# Patient Record
Sex: FEMALE | Race: BLACK OR AFRICAN AMERICAN | NOT HISPANIC OR LATINO | Employment: FULL TIME | URBAN - METROPOLITAN AREA
[De-identification: names, ages, dates, MRNs, and addresses within clinical notes are randomized per-mention and may not be internally consistent; named-entity substitution may affect disease eponyms.]

---

## 2017-01-20 ENCOUNTER — OFFICE VISIT (OUTPATIENT)
Dept: OBSTETRICS AND GYNECOLOGY | Facility: CLINIC | Age: 27
End: 2017-01-20

## 2017-01-20 VITALS
SYSTOLIC BLOOD PRESSURE: 118 MMHG | HEIGHT: 62 IN | RESPIRATION RATE: 18 BRPM | WEIGHT: 193 LBS | BODY MASS INDEX: 35.51 KG/M2 | DIASTOLIC BLOOD PRESSURE: 80 MMHG

## 2017-01-20 DIAGNOSIS — Z30.431 IUD CHECK UP: ICD-10-CM

## 2017-01-20 DIAGNOSIS — N89.8 VAGINAL ODOR: Primary | ICD-10-CM

## 2017-01-20 DIAGNOSIS — IMO0001 WOMAN OF CHILDBEARING POTENTIAL: ICD-10-CM

## 2017-01-20 LAB
B-HCG UR QL: NEGATIVE
INTERNAL NEGATIVE CONTROL: NORMAL
INTERNAL POSITIVE CONTROL: NORMAL
Lab: NORMAL

## 2017-01-20 PROCEDURE — 81025 URINE PREGNANCY TEST: CPT | Performed by: OBSTETRICS & GYNECOLOGY

## 2017-01-20 PROCEDURE — 99213 OFFICE O/P EST LOW 20 MIN: CPT | Performed by: OBSTETRICS & GYNECOLOGY

## 2017-01-20 NOTE — PROGRESS NOTES
"Subjective   Chief Complaint   Patient presents with   • Follow-up     Mirena String Check.    • Vaginitis     C/o vaginal odor x 2 weeks      Jaida Jurado is a 26 y.o. year old  presenting to be seen for evaluation of an abnormal vaginal discharge. The discharge is white.  Her symptoms have been present for 2 week(s).  Additional she has noticed odor.    Prior to the onset of symptoms she was not on systemic antibiotics.  She has not recently changed soaps/detergents/toilet tissue.  Prior to this visit, she has used nothing in an attempt to improve her symptoms.    SEXUAL Hx:  She is currently sexually active.  In the past year there has not been new sexual partners.    Condoms are not typically used.  She would not like to be screened for STD's at today's exam.  Current birth control method: IUD - Mirena.  She is happy with her current method of contraception and does not want to discuss alternative methods of contraception..  OTHER COMPLAINTS:  none    The following portions of the patient's history were reviewed and updated as appropriate:current medications, allergies and past surgical history    Review of Systems  Consitutional POS: nothing reported    NEG: anorexia or night sweats   Gastointestinal POS: nothing reported    NEG: bloating, change in bowel habits, melena or reflux symptoms   Genitourinary POS: nothing reported    NEG: dysuria or hematuria   Integument POS: nothing reported    NEG: moles that are changing in size, shape, color or rashes        Objective   Visit Vitals   • /80   • Resp 18   • Ht 62\" (157.5 cm)   • Wt 193 lb (87.5 kg)   • LMP 2016   • BMI 35.3 kg/m2       General:  well developed; well nourished  no acute distress   Skin:  No suspicious lesions seen   Pelvis: Clinical staff was present for exam  External genitalia:  normal appearance of the external genitalia including Bartholin's and [place]'s glands.  :  urethral meatus normal;  Vaginal:  normal pink " mucosa without prolapse or lesions.  Cervix:  normal appearance. IUD string present - 0.5 cms in length;  Uterus:  normal size, shape and consistency.  Adnexa:  normal bimanual exam of the adnexa.  Rectal:  digital rectal exam not performed; anus visually normal appearing.     Lab Review   UPT    Imaging   No data reviewed       Assessment   1. 25yo , vaginitis  2. Mirena IUD in place     Plan   1. Vaginitis swab  2. Follow up for annual exam in 12 months           This note was electronically signed.    Asmita Albarran,   2017

## 2017-01-27 ENCOUNTER — TELEPHONE (OUTPATIENT)
Dept: OBSTETRICS AND GYNECOLOGY | Facility: CLINIC | Age: 27
End: 2017-01-27

## 2017-01-27 RX ORDER — METRONIDAZOLE 500 MG/1
500 TABLET ORAL 2 TIMES DAILY
Qty: 14 TABLET | Refills: 0 | Status: SHIPPED | OUTPATIENT
Start: 2017-01-27 | End: 2017-02-03

## 2017-01-27 NOTE — TELEPHONE ENCOUNTER
[phone] called patient regarding test results. I will send E-Rx for Flagyl 500mg bid to [facility] in [place], [place]

## 2017-07-11 ENCOUNTER — OFFICE VISIT (OUTPATIENT)
Dept: OBSTETRICS AND GYNECOLOGY | Facility: CLINIC | Age: 27
End: 2017-07-11

## 2017-07-11 VITALS
SYSTOLIC BLOOD PRESSURE: 110 MMHG | HEART RATE: 50 BPM | WEIGHT: 183 LBS | OXYGEN SATURATION: 99 % | BODY MASS INDEX: 33.68 KG/M2 | DIASTOLIC BLOOD PRESSURE: 80 MMHG | RESPIRATION RATE: 14 BRPM | HEIGHT: 62 IN

## 2017-07-11 DIAGNOSIS — Z97.5 IUD (INTRAUTERINE DEVICE) IN PLACE: ICD-10-CM

## 2017-07-11 PROCEDURE — 99212 OFFICE O/P EST SF 10 MIN: CPT | Performed by: OBSTETRICS & GYNECOLOGY

## 2017-07-11 PROCEDURE — 58301 REMOVE INTRAUTERINE DEVICE: CPT | Performed by: OBSTETRICS & GYNECOLOGY

## 2017-07-17 ENCOUNTER — OFFICE VISIT (OUTPATIENT)
Dept: OBSTETRICS AND GYNECOLOGY | Facility: CLINIC | Age: 27
End: 2017-07-17

## 2017-07-17 VITALS
HEIGHT: 62 IN | DIASTOLIC BLOOD PRESSURE: 80 MMHG | WEIGHT: 186.6 LBS | OXYGEN SATURATION: 99 % | HEART RATE: 59 BPM | BODY MASS INDEX: 34.34 KG/M2 | SYSTOLIC BLOOD PRESSURE: 112 MMHG | RESPIRATION RATE: 14 BRPM

## 2017-07-17 DIAGNOSIS — T83.32XS MALPOSITIONED IUD, SEQUELA: Primary | ICD-10-CM

## 2017-07-17 PROCEDURE — S0260 H&P FOR SURGERY: HCPCS | Performed by: OBSTETRICS & GYNECOLOGY

## 2017-07-17 NOTE — PROGRESS NOTES
IUD Removal    Date of procedure:  July 11, 2017    Risks and benefits discussed? yes  All questions answered? yes  Consents given by The patient  Written consent obtained? yes  Reason for removal: Side effect: Postcoital bleeding    Local anesthesia used:  no    Procedure documentation:    A speculum was placed in order to view the cervix.  A tenaculum did not need to be placed on the anterior cervical lip.  Cervical dilation did not need to be performed in order to access the string.  The IUD string was easily seen.  The string was grasped and the IUD was not removed removed.  The IUD did appear to be adherent to the uterine cavity. It was not removed.    She tolerated the procedure without any difficulty.     Post procedure instructions: Patient notified to call with heavy bleeding, fever or increasing pain.    Discussed inability to remove IUD with patient after procedure was aborted.  Will schedule patient for hysteroscopic removal.    This note was electronically signed.    Leo Fu M.D. RAISA.

## 2017-07-17 NOTE — PROGRESS NOTES
"Subjective   Chief Complaint   Patient presents with   • Vaginal Bleeding     Bleeding after intercourse     Jaida Jurado is a 26 y.o. year old .  Patient's last menstrual period was 2017 (exact date).  She presents to be seen because of Postcoital bleeding.  She reports a 6 month history of irregular bleeding after intercourse.  She notes that it happens with each episode.  The bleeding can be spotty to mimicking a full flow.  It can last 1-2 days.     OTHER COMPLAINTS:  Nothing else    The following portions of the patient's history were reviewed and updated as appropriate:current medications and allergies    Smoking status: Former Smoker                                                              Packs/day: 0.25      Years: 3.00         Quit date: 2015  Smokeless status: Never Used                        Review of Systems  Constitutional POS: nothing reported    NEG: anorexia or night sweats   Gastointestinal POS: nothing reported    NEG: bloating, change in bowel habits, melena or reflux symptoms   Genitourinary POS: Per history of present illness    NEG: dysuria or hematuria   Integument POS: nothing reported    NEG: moles that are changing in size, shape, color or rashes   Breast POS: nothing reported    NEG: persistent breast lump, skin dimpling or nipple discharge         Objective   /80  Pulse 50  Resp 14  Ht 62\" (157.5 cm)  Wt 183 lb (83 kg)  LMP 2017 (Exact Date)  SpO2 99%  Breastfeeding? No  BMI 33.47 kg/m2    General:  well developed; well nourished  no acute distress   Skin:  No suspicious lesions seen   Thyroid: normal to inspection and palpation   Lungs:  breathing is unlabored   Heart:  Regular rate and rhythm    Breasts:  Not performed.   Abdomen: soft, non-tender; no masses  no umbilical or inginual hernias are present  no hepato-splenomegaly   Pelvis: Clinical staff was present for exam  External genitalia:  normal appearance of the external genitalia " including Bartholin's and [place]'s glands.  :  urethral meatus normal; urethral hypermobility is absent.  Vaginal:  normal pink mucosa without prolapse or lesions. blood present -  dark red and small amount;  Cervix:  normal appearance. IUD string present - 2 cms in length;     Lab Review   No data reviewed    Imaging   No data reviewed        Assessment   1. Postcoital bleeding     Plan   1. Discussed plan of care with patient.  Patient opts for IUD removal at this time.  See accompanying note for details      No orders of the defined types were placed in this encounter.         This note was electronically signed.    Leo Fu M.D. RAISA

## 2017-07-18 NOTE — PROGRESS NOTES
"[facility]  Jaida Jurado  : 1990  MRN: [id number]  CSN: [id number]    History and Physical    Subjective   Jaida Jurado is a 26 y.o. year old  who presents for preoperative counseling for hysteroscopic removal of IUD and bilateral salpingectomy.  Patient desired removal of Mirena IUD but it was unable to be removed in the office. She has undesired fertility and seeks sterilization.    Patient Active Problem List   Diagnosis   • IUD (intrauterine device) in place     Past Medical History:   Diagnosis Date   • Vaginal delivery     2008     Past Surgical History:   Procedure Laterality Date   •  SECTION  2014   •  SECTION N/A 2016    Procedure:  SECTION REPEAT;  Surgeon: Molly Albarran DO;  Location: [facility] LABOR DELIVERY;  Service:      Social History     Social History   • Marital status:      Spouse name: N/A   • Number of children: N/A   • Years of education: N/A     Social History Main Topics   • Smoking status: Former Smoker     Packs/day: 0.25     Years: 3.00     Quit date: 2015   • Smokeless tobacco: Never Used   • Alcohol use No   • Drug use: No   • Sexual activity: Yes     Partners: Male     Birth control/ protection: IUD     Other Topics Concern   • None     Social History Narrative       Current Outpatient Prescriptions:   •  levonorgestrel (MIRENA, 52 MG,) 20 MCG/24HR IUD, 1 each by Intrauterine route 1 (One) Time., Disp: , Rfl:     No Known Allergies    Review of Systems      Objective   /80  Pulse 59  Resp 14  Ht 62\" (157.5 cm)  Wt 186 lb 9.6 oz (84.6 kg)  LMP 2017 (Exact Date) Comment: Mirena  SpO2 99%  Breastfeeding? No  BMI 34.13 kg/m2  General: well developed; well nourished  no acute distress   Heart: regular rate and rhythm, S1, S2 normal, no murmur, click, rub or gallop   Lungs: breathing is unlabored   Abdomen: soft, non-tender; no masses  no umbilical or inginual hernias are present  no " hepato-splenomegaly   Pelvis:: Clinical staff was present for exam  External genitalia:  normal appearance of the external genitalia including Bartholin's and [place]'s glands.  :  urethral meatus normal; urethral hypermobility is absent.  Vaginal:  normal pink mucosa without prolapse or lesions.  Cervix:  normal appearance. IUD string present - 2 cms in length;   Labs  Lab Results   Component Value Date     09/17/2016    HGB 9.1 (L) 09/17/2016    HCT 28.3 (L) 09/17/2016    WBC 12.94 (H) 09/17/2016        Assessment   1. Embedded IUD     Plan   Today I discussed with Jaida the risks of her upcoming surgical procedure. Risks including intraoperative bleeding, infection at the site of surgery, damage to the adjacent surrounding organs, catheter introduced urinary tract infections and the small risk for deep vein thrombosis were all explained. Additionally, the small risk for reoperation in the event of unanticipated bleeding or surgical injury was discussed.  All of her questions were answered fully.  She left with a very clear understanding of the preoperative surgical indications and the nature of the surgery for which she is scheduled.  She understands to be NPO after midnight and to be at the preoperative area at least 1-1/2 hours prior to the scheduled surgical start time. I counseled pt on the fact that the surgery is not 100% effective.  Up to 1-2% of women who undergo bilateral tubal ligation will still become pregnant.  This may be a tubal (ectopic) pregnancy which can be life-threatening.  Tubal reanastamosis (putting the tubes back together) after this surgery is often very difficult or impossible.  You must not assume you can change your mind and must view this procedure as permanent and irreversible.   You must consider how your life may change including your preferences for more children if a spouse or child were to die, remarriage were to occur, or there were other life-changing events  including an unexpected change to your financial status as these could cause you to regret this procedure.  You may change your mind and decide not to have this procedure at anytime prior to the procedure.  You may also decide to postpone the procedure until you are sure of your decision.   There are other ways to keep from becoming pregnant including, but not limited to abstinence, vasectomy for male partner, ESSURE, intrauterine device (IUD) insertion, hormonal contraception including pills, patches, injections, and implants, condoms, and ovulation prediction (rhythm method).  I discussed the common side effects of each and the efficacy of each method. No promise of method of tubal interruption has been made (i.e., cut, burn, tie, clip).   In order to complete the sterilization procedure, a larger incision, longer hospital stay, or more involved surgical procedures may sometimes be required.  Unexpected findings or complications during your procedure may require additional surgery or procedures that we have not discussed ahead of time.  1.     Leo Fu MD RAISA  7/18/2017  3:43 AM

## 2017-07-19 ENCOUNTER — LAB REQUISITION (OUTPATIENT)
Dept: LAB | Facility: HOSPITAL | Age: 27
End: 2017-07-19

## 2017-07-19 ENCOUNTER — OUTSIDE FACILITY SERVICE (OUTPATIENT)
Dept: OBSTETRICS AND GYNECOLOGY | Facility: CLINIC | Age: 27
End: 2017-07-19

## 2017-07-19 DIAGNOSIS — Z30.432 ENCOUNTER FOR REMOVAL OF INTRAUTERINE CONTRACEPTIVE DEVICE: ICD-10-CM

## 2017-07-19 PROCEDURE — 58661 LAPAROSCOPY REMOVE ADNEXA: CPT | Performed by: OBSTETRICS & GYNECOLOGY

## 2017-07-19 PROCEDURE — 58562 HYSTEROSCOPY REMOVE FB: CPT | Performed by: OBSTETRICS & GYNECOLOGY

## 2017-07-19 PROCEDURE — 88302 TISSUE EXAM BY PATHOLOGIST: CPT | Performed by: OBSTETRICS & GYNECOLOGY

## 2017-07-20 LAB
CYTO UR: NORMAL
LAB AP CASE REPORT: NORMAL
LAB AP CLINICAL INFORMATION: NORMAL
Lab: NORMAL
PATH REPORT.FINAL DX SPEC: NORMAL
PATH REPORT.GROSS SPEC: NORMAL

## 2017-08-03 ENCOUNTER — OFFICE VISIT (OUTPATIENT)
Dept: OBSTETRICS AND GYNECOLOGY | Facility: CLINIC | Age: 27
End: 2017-08-03

## 2017-08-03 VITALS
RESPIRATION RATE: 14 BRPM | WEIGHT: 185 LBS | SYSTOLIC BLOOD PRESSURE: 100 MMHG | HEART RATE: 87 BPM | OXYGEN SATURATION: 98 % | DIASTOLIC BLOOD PRESSURE: 70 MMHG | BODY MASS INDEX: 34.04 KG/M2 | HEIGHT: 62 IN

## 2017-08-03 DIAGNOSIS — Z09 POSTOPERATIVE EXAMINATION: Primary | ICD-10-CM

## 2017-08-03 PROCEDURE — 99024 POSTOP FOLLOW-UP VISIT: CPT | Performed by: OBSTETRICS & GYNECOLOGY

## 2017-08-03 RX ORDER — IBUPROFEN 800 MG/1
TABLET ORAL
Refills: 3 | COMMUNITY
Start: 2017-07-19

## 2017-08-03 NOTE — PROGRESS NOTES
"Subjective   Chief Complaint   Patient presents with   • Post-op     No complaints     Jaida Jurado is a 26 y.o. year old  presenting to be seen for her post-operative visit.  Currently she reports no problems with eating, bowel movements, voiding, or wound drainage and pain is well controlled.    The pathology results from her procedure are in Jaida's record and are benign.      OTHER COMPLAINTS:  Nothing else    The following portions of the patient's history were reviewed and updated as appropriate:current medications and allergies       Objective   /70  Pulse 87  Resp 14  Ht 62\" (157.5 cm)  Wt 185 lb (83.9 kg)  LMP 2017 (Exact Date)  SpO2 98%  Breastfeeding? No  BMI 33.84 kg/m2    General:  well developed; well nourished  no acute distress   Abdomen: soft, non-tender; no masses  no umbilical or inginual hernias are present  no hepato-splenomegaly  incisions are healed   Pelvis: Not performed.          Assessment   1. S/P bilateral salpingectomy     Plan   1. May return to full activity with no restrictions  2. Follow up for annual exam 8 months    New Medications Ordered This Visit   Medications   • ibuprofen (ADVIL,MOTRIN) 800 MG tablet     Sig: TAKE 1 TABLET BY MOUTH EVERY EIGHT HOURS as needed for pain     Refill:  3          This note was electronically signed.    Leo Fu M.D. RAISA  August 3, 2017  "

## 2018-01-04 ENCOUNTER — HOSPITAL ENCOUNTER (EMERGENCY)
Facility: HOSPITAL | Age: 28
Discharge: HOME | End: 2018-01-04
Attending: STUDENT IN AN ORGANIZED HEALTH CARE EDUCATION/TRAINING PROGRAM | Admitting: STUDENT IN AN ORGANIZED HEALTH CARE EDUCATION/TRAINING PROGRAM

## 2018-01-04 VITALS
TEMPERATURE: 97.3 F | BODY MASS INDEX: 29.44 KG/M2 | HEIGHT: 62 IN | WEIGHT: 160 LBS | RESPIRATION RATE: 18 BRPM | DIASTOLIC BLOOD PRESSURE: 73 MMHG | OXYGEN SATURATION: 100 % | HEART RATE: 59 BPM | SYSTOLIC BLOOD PRESSURE: 116 MMHG

## 2018-01-04 DIAGNOSIS — T19.2XXA VAGINAL FOREIGN BODY, INITIAL ENCOUNTER: Primary | ICD-10-CM

## 2018-01-04 PROCEDURE — 99283 EMERGENCY DEPT VISIT LOW MDM: CPT

## 2018-01-04 NOTE — ED PROVIDER NOTES
Subjective   HPI Comments: Patient is a 27-year-old female that presents with concerns for a tampon stuck in her vagina.  It is benign less than 2 days.  States she tried to get it out but believes it is turned sideways and she cannot get stranding.  Patient has no pain or discomfort at this time.  No fever, chills, sweats, nausea, vomiting.      Review of Systems   All other systems reviewed and are negative.      Past Medical History:   Diagnosis Date   • Vaginal delivery     2008       No Known Allergies    Past Surgical History:   Procedure Laterality Date   •  SECTION  2014   •  SECTION N/A 2016    Procedure:  SECTION REPEAT;  Surgeon: Molly Albarran DO;  Location: [facility] LABOR DELIVERY;  Service:    • TUBAL ABDOMINAL LIGATION Bilateral 2017       History reviewed. No pertinent family history.    Social History     Social History   • Marital status:      Spouse name: N/A   • Number of children: N/A   • Years of education: N/A     Social History Main Topics   • Smoking status: Former Smoker     Packs/day: 0.25     Years: 3.00     Quit date: 2015   • Smokeless tobacco: Never Used   • Alcohol use No   • Drug use: No   • Sexual activity: Yes     Partners: Male     Birth control/ protection: Surgical      Comment: Bilateral tubal ligation     Other Topics Concern   • None     Social History Narrative           Objective   Physical Exam   Nursing note and vitals reviewed.    GEN: No acute distress  Head: Normocephalic, atraumatic  Eyes: Pupils equal round reactive to light  ENT: Posterior pharynx normal in appearance, oral mucosa is moist  Chest: Nontender to palpation  Cardiovascular: Regular rate  Lungs: Clear to auscultation bilaterally  Abdomen: Soft, nontender, nondistended, no peritoneal signs  Extremities: No edema, normal appearance  Neuro: GCS 15  Psych: Mood and affect are appropriate    Vaginal speculum exam does show tampon in vagina    Foreign  Body Removal - Orifice  Date/Time: 1/4/2018 7:49 AM  Performed by: BRADEN BULLARD  Authorized by: BRADEN BULLARD     Consent:     Consent obtained:  Verbal    Consent given by:  Patient  Location:     Location:  Vagina  Pre-procedure details:     Imaging:  None  Anesthesia (see MAR for exact dosages):     Topical anesthetic:  None  Procedure details:     Localization method:  Direct visualization and speculum    Removal mechanism:  Ring forceps    Procedure complexity:  Simple    Foreign bodies recovered:  1    Description:  Tampon    Intact foreign body removal: yes    Post-procedure details:     Confirmation:  No additional foreign bodies on visualization    Patient tolerance of procedure:  Tolerated well, no immediate complications             ED Course  ED Course                  MDM  Number of Diagnoses or Management Options  Vaginal foreign body, initial encounter:   Diagnosis management comments: Tampon is removed with no complications      Final diagnoses:   Vaginal foreign body, initial encounter            Braden Bullard MD  01/04/18 0751

## 2023-08-19 ENCOUNTER — HOSPITAL ENCOUNTER (EMERGENCY)
Facility: HOSPITAL | Age: 33
Discharge: HOME | End: 2023-08-19
Attending: EMERGENCY MEDICINE
Payer: OTHER MISCELLANEOUS

## 2023-08-19 VITALS
DIASTOLIC BLOOD PRESSURE: 103 MMHG | BODY MASS INDEX: 37.25 KG/M2 | SYSTOLIC BLOOD PRESSURE: 147 MMHG | WEIGHT: 202.4 LBS | TEMPERATURE: 97.5 F | HEART RATE: 68 BPM | OXYGEN SATURATION: 97 % | RESPIRATION RATE: 18 BRPM | HEIGHT: 62 IN
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
## 2023-08-19 LAB
ALBUMIN SERPL-MCNC: 4.5 G/DL (ref 3.5–5.2)
ALBUMIN/GLOB SERPL: 1.5 G/DL
ALP SERPL-CCNC: 47 U/L (ref 39–117)
ALT SERPL W P-5'-P-CCNC: 41 U/L (ref 1–33)
ANION GAP SERPL CALCULATED.3IONS-SCNC: 11.3 MMOL/L (ref 5–15)
AST SERPL-CCNC: 27 U/L (ref 1–32)
BILIRUB SERPL-MCNC: 0.2 MG/DL (ref 0–1.2)
BUN SERPL-MCNC: 14 MG/DL (ref 6–20)
BUN/CREAT SERPL: 20.6 (ref 7–25)
CALCIUM SPEC-SCNC: 8.9 MG/DL (ref 8.6–10.5)
CHLORIDE SERPL-SCNC: 103 MMOL/L (ref 98–107)
CO2 SERPL-SCNC: 22.7 MMOL/L (ref 22–29)
CREAT SERPL-MCNC: 0.68 MG/DL (ref 0.57–1)
DEPRECATED RDW RBC AUTO: 38.1 FL (ref 37–54)
EGFRCR SERPLBLD CKD-EPI 2021: 118.1 ML/MIN/1.73
ERYTHROCYTE [DISTWIDTH] IN BLOOD BY AUTOMATED COUNT: 14.1 % (ref 12.3–15.4)
GLOBULIN UR ELPH-MCNC: 3.1 GM/DL
GLUCOSE SERPL-MCNC: 87 MG/DL (ref 65–99)
HCT VFR BLD AUTO: 40.2 % (ref 34–46.6)
HCV AB SER DONR QL: NORMAL
HGB BLD-MCNC: 13.1 G/DL (ref 12–15.9)
HIV1 P24 AG SER QL: NORMAL
HIV1+2 AB SER QL: NORMAL
MCH RBC QN AUTO: 24.7 PG (ref 26.6–33)
MCHC RBC AUTO-ENTMCNC: 32.6 G/DL (ref 31.5–35.7)
MCV RBC AUTO: 75.8 FL (ref 79–97)
PLATELET # BLD AUTO: 350 10*3/MM3 (ref 140–450)
PMV BLD AUTO: 9.5 FL (ref 6–12)
POTASSIUM SERPL-SCNC: 3.7 MMOL/L (ref 3.5–5.2)
PROT SERPL-MCNC: 7.6 G/DL (ref 6–8.5)
RBC # BLD AUTO: 5.3 10*6/MM3 (ref 3.77–5.28)
SODIUM SERPL-SCNC: 137 MMOL/L (ref 136–145)
WBC NRBC COR # BLD: 4.78 10*3/MM3 (ref 3.4–10.8)

## 2023-08-19 PROCEDURE — 85027 COMPLETE CBC AUTOMATED: CPT | Performed by: PHYSICIAN ASSISTANT

## 2023-08-19 PROCEDURE — 99282 EMERGENCY DEPT VISIT SF MDM: CPT

## 2023-08-19 PROCEDURE — 80053 COMPREHEN METABOLIC PANEL: CPT | Performed by: PHYSICIAN ASSISTANT

## 2023-08-19 PROCEDURE — 86706 HEP B SURFACE ANTIBODY: CPT | Performed by: PHYSICIAN ASSISTANT

## 2023-08-19 PROCEDURE — 36415 COLL VENOUS BLD VENIPUNCTURE: CPT

## 2023-08-19 PROCEDURE — 87340 HEPATITIS B SURFACE AG IA: CPT | Performed by: PHYSICIAN ASSISTANT

## 2023-08-19 PROCEDURE — G0475 HIV COMBINATION ASSAY: HCPCS | Performed by: PHYSICIAN ASSISTANT

## 2023-08-19 PROCEDURE — 86803 HEPATITIS C AB TEST: CPT | Performed by: PHYSICIAN ASSISTANT

## 2023-08-19 RX ORDER — ESCITALOPRAM OXALATE 10 MG/1
10 TABLET ORAL DAILY
COMMUNITY

## 2023-08-20 ENCOUNTER — TELEPHONE (OUTPATIENT)
Dept: EMERGENCY DEPT | Facility: HOSPITAL | Age: 33
End: 2023-08-20
Payer: MEDICAID

## 2023-08-20 LAB
HBV SURFACE AB SER RIA-ACNC: REACTIVE
HBV SURFACE AG SERPL QL IA: NORMAL

## 2023-08-20 NOTE — DISCHARGE INSTRUCTIONS
You will need to follow-up with your family doctor by calling Monday to make an appointment as there will be other blood tests that you need to have done at predetermined intervals according to the office's protocol.  We will call you with any abnormal findings on your labs drawn here today.

## 2023-08-20 NOTE — ED PROVIDER NOTES
Subjective  History of Present Illness:    Chief Complaint:   Chief Complaint   Patient presents with    Body Fluid Exposure      History of Present Illness: Jaida Jurado is a 32 y.o. female who presents to the emergency department complaining of accidental needlestick.  Was administering insulin to the patient in assisted living facility in [place].  Patient states she looked in the patient's MAR and there was no history of any hepatitis or HIV.  She just wants to be checked and evaluated.  She has no history of hepatitis or HIV.  Onset: Just prior to arrival  Duration: Single episode  Exacerbating / Alleviating factors: None  Associated symptoms: None      Nurses Notes reviewed and agree, including vitals, allergies, social history and prior medical history.     Review of Systems   Constitutional: Negative.    HENT: Negative.     Eyes: Negative.    Respiratory: Negative.     Cardiovascular: Negative.    Gastrointestinal: Negative.    Genitourinary: Negative.    Musculoskeletal: Negative.    Skin:         Small puncture wound to left index finger   Neurological: Negative.    Psychiatric/Behavioral: Negative.       Past Medical History:   Diagnosis Date    Vaginal delivery     2008       Allergies:    Patient has no known allergies.      Past Surgical History:   Procedure Laterality Date     SECTION  2014     SECTION N/A 2016    Procedure:  SECTION REPEAT;  Surgeon: Molly Albarran DO;  Location: [facility] LABOR DELIVERY;  Service:     TUBAL ABDOMINAL LIGATION Bilateral 2017         Social History     Socioeconomic History    Marital status:    Tobacco Use    Smoking status: Former     Packs/day: 0.25     Years: 3.00     Pack years: 0.75     Types: Cigarettes     Quit date: 2015     Years since quittin.2    Smokeless tobacco: Never   Substance and Sexual Activity    Alcohol use: No    Drug use: No    Sexual activity: Yes     Partners: Male      "Birth control/protection: Surgical     Comment: Bilateral tubal ligation         History reviewed. No pertinent family history.    Objective  Physical Exam:  BP (!) 147/103 (BP Location: Left arm, Patient Position: Sitting)   Pulse 68   Temp 97.5 øF (36.4 øC) (Oral)   Resp 18   Ht 157.5 cm (62\")   Wt 91.8 kg (202 lb 6.4 oz)   LMP 07/20/2023   SpO2 97%   BMI 37.02 kg/mý      Physical Exam  Vitals and nursing note reviewed.   Constitutional:       General: She is not in acute distress.     Appearance: Normal appearance. She is normal weight. She is not ill-appearing, toxic-appearing or diaphoretic.   HENT:      Head: Normocephalic and atraumatic.      Nose: Nose normal.   Eyes:      Extraocular Movements: Extraocular movements intact.   Cardiovascular:      Rate and Rhythm: Normal rate.   Pulmonary:      Effort: Pulmonary effort is normal.   Abdominal:      General: Abdomen is flat.   Musculoskeletal:         General: Normal range of motion.      Cervical back: Normal range of motion.   Skin:     General: Skin is warm and dry.      Comments: Small tiny puncture site to the palmar aspect of the left index finger.  No bleeding.  No palpable foreign body.   Neurological:      General: No focal deficit present.      Mental Status: She is alert.   Psychiatric:         Mood and Affect: Mood normal.         Behavior: Behavior normal.         Procedures    ED Course:         Lab Results (last 24 hours)       Procedure Component Value Units Date/Time    Hepatitis B Surface Antigen [[id number]] Collected: 08/19/23 2008    Specimen: Blood Updated: 08/19/23 2011    Hepatitis B Surface Antibody [[id number]] Collected: 08/19/23 2008    Specimen: Blood Updated: 08/19/23 2011    Hepatitis C Antibody [[id number]] Collected: 08/19/23 2008    Specimen: Blood Updated: 08/19/23 2011    HIV-1 / O / 2 Ag / Antibody 4th Generation [[id number]] Collected: 08/19/23 2008    Specimen: Blood Updated: 08/19/23 2011    Comprehensive Metabolic Panel " [[id number]] Collected: 08/19/23 2008    Specimen: Blood Updated: 08/19/23 2011    CBC (No Diff) [[id number]]  (Abnormal) Collected: 08/19/23 2008    Specimen: Blood Updated: 08/19/23 2016     WBC 4.78 10*3/mm3      RBC 5.30 10*6/mm3      Hemoglobin 13.1 g/dL      Hematocrit 40.2 %      MCV 75.8 fL      MCH 24.7 pg      MCHC 32.6 g/dL      RDW 14.1 %      RDW-SD 38.1 fl      MPV 9.5 fL      Platelets 350 10*3/mm3              No radiology results from the last 24 hrs       Medical Decision Making  Amount and/or Complexity of Data Reviewed  Labs: ordered.        Jaida Jurado is a 32 y.o. female who presents to the emergency department for evaluation of needlestick exposure    Differential diagnosis includes body fluid exposure among other etiologies.    Needlestick panel ordered for further evaluation of the patient's presentation.    Chart review if available included outside testing, previous visits, prior labs, prior imaging, available notes from prior evaluations or visits with specialists, medication list, allergies, past medical history, past surgical history when applicable.    Patient was treated with N/A    Splane to patient she will need follow-up with PCP for repeat testing of hepatitis antibodies and following up on these labs.     Plan for disposition is discharged home.  Patient/family comfortable with and understanding of the plan.      Final diagnoses:   Needlestick injury accident with exposure to body fluid          Erasmo Barrios PA-C  08/19/23 2032

## 2024-01-25 ENCOUNTER — HOSPITAL ENCOUNTER (EMERGENCY)
Facility: HOSPITAL | Age: 34
Discharge: HOME | End: 2024-01-25
Attending: EMERGENCY MEDICINE
Payer: MEDICAID

## 2024-01-25 VITALS
RESPIRATION RATE: 16 BRPM | DIASTOLIC BLOOD PRESSURE: 60 MMHG | TEMPERATURE: 97.5 F | HEART RATE: 81 BPM | WEIGHT: 200.2 LBS | SYSTOLIC BLOOD PRESSURE: 108 MMHG | BODY MASS INDEX: 36.62 KG/M2 | OXYGEN SATURATION: 99 %

## 2024-01-25 DIAGNOSIS — R11.2 NAUSEA VOMITING AND DIARRHEA: Primary | ICD-10-CM

## 2024-01-25 DIAGNOSIS — R19.7 NAUSEA VOMITING AND DIARRHEA: Primary | ICD-10-CM

## 2024-01-25 LAB
ALBUMIN SERPL-MCNC: 4.5 G/DL (ref 3.5–5.2)
ALBUMIN/GLOB SERPL: 1.2 G/DL
ALP SERPL-CCNC: 55 U/L (ref 39–117)
ALT SERPL W P-5'-P-CCNC: 22 U/L (ref 1–33)
AMORPH URATE CRY URNS QL MICRO: ABNORMAL /HPF
ANION GAP SERPL CALCULATED.3IONS-SCNC: 11.6 MMOL/L (ref 5–15)
AST SERPL-CCNC: 16 U/L (ref 1–32)
BACTERIA UR QL AUTO: ABNORMAL /HPF
BASOPHILS # BLD AUTO: 0.02 10*3/MM3 (ref 0–0.2)
BASOPHILS NFR BLD AUTO: 0.2 % (ref 0–1.5)
BILIRUB SERPL-MCNC: 0.6 MG/DL (ref 0–1.2)
BILIRUB UR QL STRIP: ABNORMAL
BUN SERPL-MCNC: 10 MG/DL (ref 6–20)
BUN/CREAT SERPL: 11.6 (ref 7–25)
CALCIUM SPEC-SCNC: 9 MG/DL (ref 8.6–10.5)
CHLORIDE SERPL-SCNC: 101 MMOL/L (ref 98–107)
CLARITY UR: ABNORMAL
CO2 SERPL-SCNC: 24.4 MMOL/L (ref 22–29)
COLOR UR: ABNORMAL
CREAT SERPL-MCNC: 0.86 MG/DL (ref 0.57–1)
DEPRECATED RDW RBC AUTO: 39.3 FL (ref 37–54)
EGFRCR SERPLBLD CKD-EPI 2021: 91 ML/MIN/1.73
EOSINOPHIL # BLD AUTO: 0.05 10*3/MM3 (ref 0–0.4)
EOSINOPHIL NFR BLD AUTO: 0.5 % (ref 0.3–6.2)
ERYTHROCYTE [DISTWIDTH] IN BLOOD BY AUTOMATED COUNT: 14.3 % (ref 12.3–15.4)
GLOBULIN UR ELPH-MCNC: 3.8 GM/DL
GLUCOSE SERPL-MCNC: 115 MG/DL (ref 65–99)
GLUCOSE UR STRIP-MCNC: NEGATIVE MG/DL
HCT VFR BLD AUTO: 42.1 % (ref 34–46.6)
HGB BLD-MCNC: 13.3 G/DL (ref 12–15.9)
HGB UR QL STRIP.AUTO: ABNORMAL
HOLD SPECIMEN: NORMAL
HOLD SPECIMEN: NORMAL
HYALINE CASTS UR QL AUTO: ABNORMAL /LPF
IMM GRANULOCYTES # BLD AUTO: 0.03 10*3/MM3 (ref 0–0.05)
IMM GRANULOCYTES NFR BLD AUTO: 0.3 % (ref 0–0.5)
KETONES UR QL STRIP: ABNORMAL
LEUKOCYTE ESTERASE UR QL STRIP.AUTO: ABNORMAL
LIPASE SERPL-CCNC: <3 U/L (ref 13–60)
LYMPHOCYTES # BLD AUTO: 1.15 10*3/MM3 (ref 0.7–3.1)
LYMPHOCYTES NFR BLD AUTO: 10.8 % (ref 19.6–45.3)
MCH RBC QN AUTO: 24.2 PG (ref 26.6–33)
MCHC RBC AUTO-ENTMCNC: 31.6 G/DL (ref 31.5–35.7)
MCV RBC AUTO: 76.7 FL (ref 79–97)
MONOCYTES # BLD AUTO: 0.73 10*3/MM3 (ref 0.1–0.9)
MONOCYTES NFR BLD AUTO: 6.8 % (ref 5–12)
NEUTROPHILS NFR BLD AUTO: 8.68 10*3/MM3 (ref 1.7–7)
NEUTROPHILS NFR BLD AUTO: 81.4 % (ref 42.7–76)
NITRITE UR QL STRIP: NEGATIVE
NRBC BLD AUTO-RTO: 0 /100 WBC (ref 0–0.2)
PH UR STRIP.AUTO: 5.5 [PH] (ref 5–8)
PLATELET # BLD AUTO: 368 10*3/MM3 (ref 140–450)
PMV BLD AUTO: 9.7 FL (ref 6–12)
POTASSIUM SERPL-SCNC: 4 MMOL/L (ref 3.5–5.2)
PROT SERPL-MCNC: 8.3 G/DL (ref 6–8.5)
PROT UR QL STRIP: ABNORMAL
RBC # BLD AUTO: 5.49 10*6/MM3 (ref 3.77–5.28)
RBC # UR STRIP: ABNORMAL /HPF
REF LAB TEST METHOD: ABNORMAL
SODIUM SERPL-SCNC: 137 MMOL/L (ref 136–145)
SP GR UR STRIP: >=1.03 (ref 1–1.03)
SQUAMOUS #/AREA URNS HPF: ABNORMAL /HPF
UROBILINOGEN UR QL STRIP: ABNORMAL
WBC # UR STRIP: ABNORMAL /HPF
WBC NRBC COR # BLD AUTO: 10.66 10*3/MM3 (ref 3.4–10.8)
WHOLE BLOOD HOLD COAG: NORMAL
WHOLE BLOOD HOLD SPECIMEN: NORMAL

## 2024-01-25 PROCEDURE — 96375 TX/PRO/DX INJ NEW DRUG ADDON: CPT

## 2024-01-25 PROCEDURE — 83690 ASSAY OF LIPASE: CPT | Performed by: EMERGENCY MEDICINE

## 2024-01-25 PROCEDURE — 96374 THER/PROPH/DIAG INJ IV PUSH: CPT

## 2024-01-25 PROCEDURE — 81001 URINALYSIS AUTO W/SCOPE: CPT | Performed by: EMERGENCY MEDICINE

## 2024-01-25 PROCEDURE — 99283 EMERGENCY DEPT VISIT LOW MDM: CPT

## 2024-01-25 PROCEDURE — 80053 COMPREHEN METABOLIC PANEL: CPT | Performed by: EMERGENCY MEDICINE

## 2024-01-25 PROCEDURE — 85025 COMPLETE CBC W/AUTO DIFF WBC: CPT | Performed by: EMERGENCY MEDICINE

## 2024-01-25 PROCEDURE — 25010000002 ONDANSETRON PER 1 MG: Performed by: EMERGENCY MEDICINE

## 2024-01-25 PROCEDURE — 25010000002 KETOROLAC TROMETHAMINE PER 15 MG: Performed by: EMERGENCY MEDICINE

## 2024-01-25 PROCEDURE — 25810000003 SODIUM CHLORIDE 0.9 % SOLUTION: Performed by: EMERGENCY MEDICINE

## 2024-01-25 RX ORDER — TRAZODONE HYDROCHLORIDE 50 MG/1
50 TABLET ORAL NIGHTLY
COMMUNITY
Start: 2024-01-04

## 2024-01-25 RX ORDER — ONDANSETRON 2 MG/ML
4 INJECTION INTRAMUSCULAR; INTRAVENOUS ONCE
Status: COMPLETED | OUTPATIENT
Start: 2024-01-25 | End: 2024-01-25

## 2024-01-25 RX ORDER — KETOROLAC TROMETHAMINE 10 MG/1
10 TABLET, FILM COATED ORAL EVERY 6 HOURS PRN
Qty: 20 TABLET | Refills: 0 | Status: SHIPPED | OUTPATIENT
Start: 2024-01-25

## 2024-01-25 RX ORDER — KETOROLAC TROMETHAMINE 30 MG/ML
15 INJECTION, SOLUTION INTRAMUSCULAR; INTRAVENOUS ONCE
Status: COMPLETED | OUTPATIENT
Start: 2024-01-25 | End: 2024-01-25

## 2024-01-25 RX ORDER — ONDANSETRON 4 MG/1
4 TABLET, ORALLY DISINTEGRATING ORAL EVERY 6 HOURS PRN
Qty: 10 TABLET | Refills: 0 | Status: SHIPPED | OUTPATIENT
Start: 2024-01-25

## 2024-01-25 RX ORDER — SODIUM CHLORIDE 0.9 % (FLUSH) 0.9 %
10 SYRINGE (ML) INJECTION AS NEEDED
Status: DISCONTINUED | OUTPATIENT
Start: 2024-01-25 | End: 2024-01-25 | Stop reason: HOSPADM

## 2024-01-25 RX ADMIN — SODIUM CHLORIDE 1000 ML: 9 INJECTION, SOLUTION INTRAVENOUS at 03:41

## 2024-01-25 RX ADMIN — KETOROLAC TROMETHAMINE 15 MG: 30 INJECTION, SOLUTION INTRAMUSCULAR; INTRAVENOUS at 03:42

## 2024-01-25 RX ADMIN — ONDANSETRON 4 MG: 2 INJECTION INTRAMUSCULAR; INTRAVENOUS at 03:42

## 2024-01-25 NOTE — Clinical Note
[facility] EMERGENCY DEPARTMENT  [address] [address]  Phone: [phone]    Jaida Jurado was seen and treated in our emergency department on 1/25/2024.  She may return to work on 01/29/2024.         Thank you for choosing [facility].    Ovidio Dwyer MD

## 2024-01-25 NOTE — ED PROVIDER NOTES
HPI: Jaida Jurado is a 33 y.o. female who presents to the emergency department complaining of vomiting, diarrhea, abdominal pain.  She states that for last 2 days she has had above symptoms.  Abdominal pain is upper abdomen, crampy without alleviating or aggravating factors.  She denies fevers, chest pain, shortness of breath.  No known sick contacts.  No recent antibiotic use or suspicious intake/exposure.      REVIEW OF SYSTEMS: All other systems reviewed and are negative     PAST MEDICAL HISTORY:   Past Medical History:   Diagnosis Date    Vaginal delivery     2008        FAMILY HISTORY:   History reviewed. No pertinent family history.     SOCIAL HISTORY:   Social History     Socioeconomic History    Marital status:    Tobacco Use    Smoking status: Former     Packs/day: 0.25     Years: 3.00     Additional pack years: 0.00     Total pack years: 0.75     Types: Cigarettes     Quit date: 2015     Years since quittin.6    Smokeless tobacco: Never   Substance and Sexual Activity    Alcohol use: No    Drug use: No    Sexual activity: Yes     Partners: Male     Birth control/protection: Surgical     Comment: Bilateral tubal ligation        SURGICAL HISTORY:   Past Surgical History:   Procedure Laterality Date     SECTION  2014     SECTION N/A 2016    Procedure:  SECTION REPEAT;  Surgeon: Molly Albarran DO;  Location: [facility] LABOR DELIVERY;  Service:     TUBAL ABDOMINAL LIGATION Bilateral 2017        ALLERGIES: Patient has no known allergies.       PHYSICAL EXAM:   VITAL SIGNS:   Vitals:    24 0500   BP: 112/71   Pulse: 81   Resp:    Temp:    SpO2: 99%      CONSTITUTIONAL: Awake, well appearing, nontoxic   HENT: Atraumatic, normocephalic, oral mucosa moist, airway patent. Nares patent without drainage. External ears normal.   EYES: Conjunctivae clear, EOMI, PERRL   NECK: Trachea midline, nontender, supple   CARDIOVASCULAR: Normal heart  rate, Normal rhythm.  PULMONARY/CHEST: Normal work of breathing. Clear to auscultation, no rhonchi, wheezes, or rales.  ABDOMINAL: Nondistended, soft, minimal epigastric tenderness, no rebound or guarding.  NEUROLOGIC: Nonfocal, moves all four extremities, no gross sensory or motor deficits.   EXTREMITIES: No clubbing, cyanosis, or edema   SKIN: Warm, Dry, No erythema, No rash       ED COURSE / MEDICAL DECISION MAKING:     Jaida Jurado is a 33 y.o. female who presents to the emergency department for evaluation of vomiting, diarrhea, abdominal pain.  She is well-appearing, she is in no distress.  Her vital signs are normal.  Her exam is notable for some minimal epigastric tenderness.  Will obtain basic labs and treat symptomatically.  Disposition pending.    Differential diagnosis includes gastroenteritis, viral illness, dehydration, gastritis among other etiologies.    Lab work is nonactionable.  She does have some bacteria in her urine but she is not symptomatic from this.  She is resting comfortably and feels improved after treatment rendered here in the ED.  I do feel she is safe for discharge home at this time.  We discussed supportive measures and outpatient follow-up.  She is happy with this plan.    Final diagnoses:   Nausea vomiting and diarrhea        Ovidio Dwyer MD  01/25/24 0699